# Patient Record
Sex: FEMALE | Race: WHITE
[De-identification: names, ages, dates, MRNs, and addresses within clinical notes are randomized per-mention and may not be internally consistent; named-entity substitution may affect disease eponyms.]

---

## 2019-06-03 ENCOUNTER — HOSPITAL ENCOUNTER (INPATIENT)
Dept: HOSPITAL 10 - E/R | Age: 54
LOS: 3 days | Discharge: HOME HEALTH | DRG: 103 | End: 2019-06-06
Attending: INTERNAL MEDICINE | Admitting: INTERNAL MEDICINE
Payer: COMMERCIAL

## 2019-06-03 ENCOUNTER — HOSPITAL ENCOUNTER (INPATIENT)
Dept: HOSPITAL 91 - E/R | Age: 54
LOS: 3 days | Discharge: HOME HEALTH | DRG: 103 | End: 2019-06-06
Payer: COMMERCIAL

## 2019-06-03 VITALS
WEIGHT: 181.88 LBS | HEIGHT: 63 IN | BODY MASS INDEX: 32.23 KG/M2 | WEIGHT: 181.88 LBS | HEIGHT: 63 IN | BODY MASS INDEX: 32.23 KG/M2

## 2019-06-03 VITALS — RESPIRATION RATE: 18 BRPM | SYSTOLIC BLOOD PRESSURE: 158 MMHG | DIASTOLIC BLOOD PRESSURE: 76 MMHG | HEART RATE: 56 BPM

## 2019-06-03 VITALS — HEART RATE: 64 BPM

## 2019-06-03 DIAGNOSIS — R20.0: ICD-10-CM

## 2019-06-03 DIAGNOSIS — R47.01: ICD-10-CM

## 2019-06-03 DIAGNOSIS — G81.94: ICD-10-CM

## 2019-06-03 DIAGNOSIS — G43.809: Primary | ICD-10-CM

## 2019-06-03 DIAGNOSIS — E11.9: ICD-10-CM

## 2019-06-03 DIAGNOSIS — Z90.49: ICD-10-CM

## 2019-06-03 DIAGNOSIS — I10: ICD-10-CM

## 2019-06-03 DIAGNOSIS — Z86.73: ICD-10-CM

## 2019-06-03 LAB
ADD MAN DIFF?: NO
ADD UMIC: YES
ANION GAP: 7 (ref 5–13)
BASOPHIL #: 0 10^3/UL (ref 0–0.1)
BASOPHILS %: 0.5 % (ref 0–2)
BLOOD UREA NITROGEN: 13 MG/DL (ref 7–20)
CALCIUM: 9.1 MG/DL (ref 8.4–10.2)
CARBON DIOXIDE: 27 MMOL/L (ref 21–31)
CHLORIDE: 108 MMOL/L (ref 97–110)
CHOL/HDL RATIO: 4.2 RATIO
CHOLESTEROL: 168 MG/DL (ref 100–200)
CK INDEX: 0.3
CK-MB: 0.24 NG/ML (ref 0–2.4)
CREATINE KINASE: 75 IU/L (ref 23–200)
CREATININE: 0.65 MG/DL (ref 0.44–1)
EOSINOPHILS #: 0.2 10^3/UL (ref 0–0.5)
EOSINOPHILS %: 2.3 % (ref 0–7)
ETHANOL: < 10 MG/DL (ref 0–0)
GLUCOSE: 92 MG/DL (ref 70–220)
HDL CHOLESTEROL: 40 MG/DL (ref 37–92)
HEMATOCRIT: 40.3 % (ref 37–47)
HEMOGLOBIN A1C: 5.4 % (ref 0–5.9)
HEMOGLOBIN: 14 G/DL (ref 12–16)
IMMATURE GRANS #M: 0.02 10^3/UL (ref 0–0.03)
IMMATURE GRANS % (M): 0.3 % (ref 0–0.43)
INR: 0.85
LDL CHOLESTEROL,CALCULATED: 88 MG/DL
LYMPHOCYTES #: 2.9 10^3/UL (ref 0.8–2.9)
LYMPHOCYTES %: 43.6 % (ref 15–51)
MEAN CORPUSCULAR HEMOGLOBIN: 28.9 PG (ref 29–33)
MEAN CORPUSCULAR HGB CONC: 34.7 G/DL (ref 32–37)
MEAN CORPUSCULAR VOLUME: 83.3 FL (ref 82–101)
MEAN PLATELET VOLUME: 10.7 FL (ref 7.4–10.4)
MONOCYTE #: 0.5 10^3/UL (ref 0.3–0.9)
MONOCYTES %: 7.9 % (ref 0–11)
NEUTROPHIL #: 3 10^3/UL (ref 1.6–7.5)
NEUTROPHILS %: 45.4 % (ref 39–77)
NUCLEATED RED BLOOD CELLS #: 0 10^3/UL (ref 0–0)
NUCLEATED RED BLOOD CELLS%: 0 /100WBC (ref 0–0)
PARTIAL THROMBOPLASTIN TIME: 29.7 SEC (ref 23–35)
PLATELET COUNT: 192 10^3/UL (ref 140–415)
POTASSIUM: 3.5 MMOL/L (ref 3.5–5.1)
PROTIME: 11.7 SEC (ref 11.9–14.9)
PT RATIO: 0.9
RED BLOOD COUNT: 4.84 10^6/UL (ref 4.2–5.4)
RED CELL DISTRIBUTION WIDTH: 12.1 % (ref 11.5–14.5)
SODIUM: 142 MMOL/L (ref 135–144)
TRIGLYCERIDES: 198 MG/DL (ref 0–149)
TROPONIN-I: < 0.012 NG/ML (ref 0–0.12)
UR ASCORBIC ACID: NEGATIVE MG/DL
UR BACTERIA: (no result) /HPF
UR BILIRUBIN (DIP): NEGATIVE MG/DL
UR BLOOD (DIP): (no result) MG/DL
UR CLARITY: (no result)
UR COLOR: YELLOW
UR GLUCOSE (DIP): NEGATIVE MG/DL
UR KETONES (DIP): NEGATIVE MG/DL
UR LEUKOCYTE ESTERASE (DIP): NEGATIVE LEU/UL
UR NITRITE (DIP): NEGATIVE MG/DL
UR PH (DIP): 7 (ref 5–9)
UR RBC: 1 /HPF (ref 0–5)
UR SPECIFIC GRAVITY (DIP): 1.01 (ref 1–1.03)
UR SQUAMOUS EPITHELIAL CELL: (no result) /HPF
UR TOTAL PROTEIN (DIP): NEGATIVE MG/DL
UR UROBILINOGEN (DIP): NEGATIVE MG/DL
UR WBC: 1 /HPF (ref 0–5)
WHITE BLOOD COUNT: 6.6 10^3/UL (ref 4.8–10.8)

## 2019-06-03 PROCEDURE — 93005 ELECTROCARDIOGRAM TRACING: CPT

## 2019-06-03 PROCEDURE — 85730 THROMBOPLASTIN TIME PARTIAL: CPT

## 2019-06-03 PROCEDURE — 70496 CT ANGIOGRAPHY HEAD: CPT

## 2019-06-03 PROCEDURE — 92526 ORAL FUNCTION THERAPY: CPT

## 2019-06-03 PROCEDURE — 82553 CREATINE MB FRACTION: CPT

## 2019-06-03 PROCEDURE — 80061 LIPID PANEL: CPT

## 2019-06-03 PROCEDURE — 71045 X-RAY EXAM CHEST 1 VIEW: CPT

## 2019-06-03 PROCEDURE — 70551 MRI BRAIN STEM W/O DYE: CPT

## 2019-06-03 PROCEDURE — 97530 THERAPEUTIC ACTIVITIES: CPT

## 2019-06-03 PROCEDURE — 96374 THER/PROPH/DIAG INJ IV PUSH: CPT

## 2019-06-03 PROCEDURE — 82962 GLUCOSE BLOOD TEST: CPT

## 2019-06-03 PROCEDURE — 36415 COLL VENOUS BLD VENIPUNCTURE: CPT

## 2019-06-03 PROCEDURE — 97116 GAIT TRAINING THERAPY: CPT

## 2019-06-03 PROCEDURE — 99285 EMERGENCY DEPT VISIT HI MDM: CPT

## 2019-06-03 PROCEDURE — 85025 COMPLETE CBC W/AUTO DIFF WBC: CPT

## 2019-06-03 PROCEDURE — 93306 TTE W/DOPPLER COMPLETE: CPT

## 2019-06-03 PROCEDURE — 70498 CT ANGIOGRAPHY NECK: CPT

## 2019-06-03 PROCEDURE — 85610 PROTHROMBIN TIME: CPT

## 2019-06-03 PROCEDURE — 84484 ASSAY OF TROPONIN QUANT: CPT

## 2019-06-03 PROCEDURE — 92610 EVALUATE SWALLOWING FUNCTION: CPT

## 2019-06-03 PROCEDURE — 80048 BASIC METABOLIC PNL TOTAL CA: CPT

## 2019-06-03 PROCEDURE — 82550 ASSAY OF CK (CPK): CPT

## 2019-06-03 PROCEDURE — 93880 EXTRACRANIAL BILAT STUDY: CPT

## 2019-06-03 PROCEDURE — 80307 DRUG TEST PRSMV CHEM ANLYZR: CPT

## 2019-06-03 PROCEDURE — 83036 HEMOGLOBIN GLYCOSYLATED A1C: CPT

## 2019-06-03 PROCEDURE — 70450 CT HEAD/BRAIN W/O DYE: CPT

## 2019-06-03 PROCEDURE — 97163 PT EVAL HIGH COMPLEX 45 MIN: CPT

## 2019-06-03 PROCEDURE — 81001 URINALYSIS AUTO W/SCOPE: CPT

## 2019-06-03 RX ADMIN — KETOROLAC TROMETHAMINE 1 MG: 30 INJECTION, SOLUTION INTRAMUSCULAR at 20:22

## 2019-06-03 RX ADMIN — VASOPRESSIN 1 ML/S: 20 INJECTION, SOLUTION INTRAMUSCULAR; SUBCUTANEOUS at 19:57

## 2019-06-03 RX ADMIN — IOHEXOL 1 ML/S: 350 INJECTION, SOLUTION INTRAVENOUS at 19:56

## 2019-06-03 RX ADMIN — SODIUM CHLORIDE 1 ML: 9 INJECTION, SOLUTION INTRAMUSCULAR; INTRAVENOUS; SUBCUTANEOUS at 19:57

## 2019-06-03 NOTE — ERD
ER Documentation


Chief Complaint


Chief Complaint





L SIDE FACIAL NUMBNESS AND L SIDE WEAKNESS, ONSET 0100. EXPRESSIVE APHASIA





HPI


53-year-old female with a history of stroke with no chronic deficits presenting 


with complaints of left facial numbness, left arm and leg weakness.  Her 


symptoms started around 1 AM with initially a right-sided throbbing headache.  


She is unsure exactly when her weakness started.  She denies any associated 


nausea, vomiting, dizziness.  She does complain of problems with her vision, 


mostly double vision.  No recent fevers or chills.  Her last stroke was in 2016 


and she had similar symptoms at that time.  No neck pain or stiffness.  No chest


pain or shortness of breath at this time.





ROS


All systems reviewed and are negative except as per history of present illness.





Medications


Home Meds


Discontinued Reported Medications


Calcium Carbonate (Tums EX) 1 Tab.chew Tab.chew


   8/16/10





Allergies


Allergies:  


Coded Allergies:  


     No Known Allergy (Verified  Allergy, Unknown, 8/16/10)


Uncoded Allergies:  


     BUSCAPINE (Allergy, Mild, 8/2/10)


     chocolates (Allergy, Mild, 8/18/10)


     eggs (Allergy, Mild, itchy, 8/18/10)


     peanuts (Allergy, Mild, 8/18/10)





PMhx/Soc


History of Surgery:  Yes (appendectomy, , cholesystecomy )


Anesthesia Reaction:  No


Hx Neurological Disorder:  Yes (CVA)


Hx Respiratory Disorders:  No


Hx Cardiac Disorders:  Yes (Hypertension)


Hx Psychiatric Problems:  Yes


Hx Miscellaneous Medical Probl:  Yes ( diabetes)


Hx Alcohol Use:  No


Hx Substance Use:  No


Hx Tobacco Use:  No


Smoking Status:  Never smoker





FmHx


Family History:  diabetes





Physical Exam


Vitals





Vital Signs


  Date      Temp  Pulse  Resp  B/P (MAP)   Pulse Ox  O2          O2 Flow    FiO2


Time                                                 Delivery    Rate


    6/3/19           62    16      122/66        99  Nasal             2.0


     21:39                           (84)            Cannula


    6/3/19           58    15      148/91        97  Room Air


     20:09                          (110)


    6/3/19           60    22      148/94        98  Room Air


     18:30                          (112)


    6/3/19  98.8     60    20      157/91        99  Room Air


     18:10                          (113)


    6/3/19                                           Nasal               2


     18:00                                           Cannula


    6/3/19  98.8     69    20      153/92        99  Room Air


     17:48                          (112)


    6/3/19  98.8     76    20      190/88        99


     17:27                          (122)





Physical Exam


Const:   No acute distress


Head:   Atraumatic 


Eyes:    Normal Conjunctiva, PERRLA, EOMI, no nystagmus.


ENT:    Normal External Ears, Nose and Mouth.


Neck:               Full range of motion. No meningismus.


Resp:   Clear to auscultation bilaterally


Cardio:   Regular rate and rhythm, no murmurs.  2+ distal pulses


Abd:    Soft, non tender, non distended. Normal bowel sounds


Skin:   No petechiae or rashes


Back:   No midline or flank tenderness


Ext:    No cyanosis, or edema


Neur:   Awake and alert, oriented x3, normal speech but slowed, no dysarthria.  


3 out of 5 strength in left upper and left lower extremity.  5 out of 5 strength


in right upper and lower extremities.  No facial asymmetry.  Cranial nerves 


intact.  Sensations diminished on the left side to painful stimuli.


Psych:    Normal Mood and Affect


Result Diagram:  


6/3/19 1750                                                                     


          6/3/19 175





Results 24 hrs





Laboratory Tests


Test
                                6/3/19
17:50  6/3/19
18:00     6/3/19
20:05


White Blood Count                    6.6 10^3/ul


Red Blood Count                     4.84 10^6/ul


Hemoglobin                             14.0 g/dl


Hematocrit                                40.3 %


Mean Corpuscular Volume                  83.3 fl


Mean Corpuscular Hemoglobin              28.9 pg


Mean Corpuscular                      34.7 g/dl 
  
             



Hemoglobin
Concent


Red Cell Distribution Width               12.1 %


Platelet Count                       192 10^3/UL


Mean Platelet Volume                     10.7 fl


Immature Granulocytes %                  0.300 %


Neutrophils %                             45.4 %


Lymphocytes %                             43.6 %


Monocytes %                                7.9 %


Eosinophils %                              2.3 %


Basophils %                                0.5 %


Nucleated Red Blood Cells %          0.0 /100WBC


Immature Granulocytes #            0.020 10^3/ul


Neutrophils #                        3.0 10^3/ul


Lymphocytes #                        2.9 10^3/ul


Monocytes #                          0.5 10^3/ul


Eosinophils #                        0.2 10^3/ul


Basophils #                          0.0 10^3/ul


Nucleated Red Blood Cells #          0.0 10^3/ul


Prothrombin Time                        11.7 Sec


Prothrombin Time Ratio                       0.9


INR International                          0.85 
  
             



Normalized
Ratio


Activated Partial
Thromboplast         29.7 Sec 
  
             



Time


Sodium Level                          142 mmol/L


Potassium Level                       3.5 mmol/L


Chloride Level                        108 mmol/L


Carbon Dioxide Level                   27 mmol/L


Anion Gap                                      7


Blood Urea Nitrogen                     13 mg/dl


Creatinine                            0.65 mg/dl


Est Glomerular Filtrat             > 60 mL/min 
   
             



Rate
mL/min


Glucose Level                           92 mg/dl


Hemoglobin A1c                             5.4 %


Calcium Level                          9.1 mg/dl


Creatine Kinase                          75 IU/L


Creatine Kinase Index                        0.3


Creatinine Kinase MB (Mass)           0.24 ng/ml


Troponin I                         < 0.012 ng/ml


Triglycerides Level                    198 mg/dl


Cholesterol Level                      168 mg/dl


LDL Cholesterol, Calculated             88 mg/dl


HDL Cholesterol                         40 mg/dl


Cholesterol/HDL Ratio                  4.2 RATIO


Ethyl Alcohol Level                < 10.0 mg/dl


Bedside Glucose                                      106 mg/dL


Urine Color                                                      YELLOW


Urine Clarity
                     
               
             SLIGHTLY
CLOUDY


Urine pH                                                                    7.0


Urine Specific Gravity                                                    1.009


Urine Ketones                                                    NEGATIVE mg/dL


Urine Nitrite                                                    NEGATIVE mg/dL


Urine Bilirubin                                                  NEGATIVE mg/dL


Urine Urobilinogen                                               NEGATIVE mg/dL


Urine Leukocyte Esterase
          
               
             NEGATIVE
Enrique/ul


Urine Microscopic RBC                                                    1 /HPF


Urine Microscopic WBC                                                    1 /HPF


Urine Squamous Epithelial
Cells    
               
             MODERATE /HPF 



Urine Bacteria                                                   FEW /HPF


Urine Hemoglobin                                                       1+ mg/dL


Urine Glucose                                                    NEGATIVE mg/dL


Urine Total Protein                                              NEGATIVE mg/dl


Urine Opiates Screen                                             Negative


Urine Barbiturates                                               Negative


Urine Amphetamines Screen                                        Negative


Urine Benzodiazepines Screen                                     Negative


Urine Cocaine Screen                                             Negative


Urine Cannabinoids                                               Negative





Current Medications


 Medications
   Dose
          Sig/[place]
       Start Time
   Status  Last


 (Trade)       Ordered        Route
 PRN     Stop Time              Admin
Dose


                              Reason                                Admin


 IV Flush
      10 ml          STK-MED        6/3/19        DC            6/3/19


(NS 10 ml)                    ONCE
 .ROUTE
  19:24
 6/3/19                19:57



                                             19:25


 Sodium         100 ml @ ud    STK-MED        6/3/19        DC            6/3/19


Chloride                      ONCE
 .ROUTE
  19:24
 6/3/19                19:57



                                             19:25


 Iohexol        100 ml @ ud    STK-MED        6/3/19        DC            6/3/19


                              ONCE
 .ROUTE
  19:24
 6/3/19                19:56



                                             19:25


 Ketorolac
     30 mg          ONCE  STAT
    6/3/19        DC            6/3/19


Tromethamine
                 IV
            20:12
 6/3/19                20:22



 (Toradol)                                   20:13


 Ondansetron    4 mg           [facility] BRIDGE      6/3/19                 



HCl
  (Zofran                 PRN
 IV
       21:30
 19


Inj)                          NAUSEA/VOMITI  21:29


                              NG


                650 mg         ER BRIDGE      6/3/19                 



Acetaminophen                 PRN
 PO
       21:30
 19



  (Tylenol                   .MILD PAIN     21:29


Tab)                          1-3 OR TEMP


 Aspirin
       81 mg          DAILY
 PO
     19        UNV      



(Halfprin)                                   09:00



 Zolpidem       5 mg           HS  PRN
 PO
   6/3/19        UNV      



Tartrate
                     INSOMNIA       22:00



(Ambien)








Procedures/MDM


EMERGENT LABS AND DIAGNOSTIC STUDIES: 


Lab Results above were reviewed and interpreted by me. 





CBC: no anemia or evidence of infection


CMP: No evidence of clinically significant electrolyte abnormality, acidosis, 


renal failure, hypoglycemia, liver disease, or biliary obstruction


Troponin within normal limits, not indicative of cardiac ischemia


UDS negative


Alcohol negative


UA: no evidence of infection





12-lead EKG was interpreted by CAMDEN Davidson MD: 


Normal Sinus Rhythm with ventricular rate of 62 beats per minute 


Normal axis 


Normal intervals 


No acute ST or T wave changes suggestive of acute ischemia or STEMI. 


___________________________________________________________ 


Radiology Results as interpreted by Radiology below were reviewed by SLito Davidson MD: 





CTA brain and neck show no large vessel occlusions








CT head shows no acute abnormalities


___________________________________________________________ 


Initial Nursing notes reviewed. 


Previous Medical Records requested via the Electronic Health Record. 





EMERGENCY DEPARTMENT COURSE / MEDICAL DECISION MAKING: 





Patient is presenting with left-sided weakness that started greater than 4 hours


ago.  The time of onset is very unclear.  Patient's vitals were notable for 


hypertension without any other significant abnormalities.  Code stroke was 


activated upon patient arrival.  However she is not a TPA candidate as time of 


onset is unclear but was definitely more than 4.5 hours ago.  I spoke with the 


tele-neurologist, who also evaluated the patient, and agrees the patient does 


not meet TPA criteria.  He did recommend admission for MRI and further stroke 


work-up, especially given the patient's history of stroke.





Neuro Critical Care:


   Time:            45 minutes


   Treatments/Evaluations:   Continuous neurologic and cardiovascular monitoring


for deterioration of neurologic function and complications, while obtaining 


immediate neurologic imaging.  Considerations made for TPA and invasive therapy 


with discussions with family.





TPA Criteria Assessment:


Patient is not a TPA candidate because: 


[Last known normal > 3 hours


Time Onset Unkown


Minimal or rapidly improving symptoms


Bleeding Diathesis


Ct shows > 1/3 MCA territory involvement


CT shows ICH


BP >185/110 even with treatment


Aggressive BP therapy required i.e. Nicardipine gtt


Hyper or Hypoglycemia < 50 or > 400 mg/dl


Seizure at onset


Stroke or Head injury < 3 months


LP < 24 hours


Major Surgery < 14 days


Brain Tumor, AVM, Aneurysm


GI/ bleed < 21 days


Arterial Puncture < 7 days


Caution NIHSS > 22





 For Symptoms 3-4.5 hrs


Age > 80


NIHSS > 25


Any Anticoagulant Use


History of CVA and DM]

















Accepting Care Team:


Current data and ongoing care discussed.


Time:                      Time of admission


Primary Provider:      [XOXOXO]


Consulting:                  [XOXOXO]


Outstanding Data:       none











Accepting Care Team:


Current data and ongoing care discussed.


Time:                      Time of admission


Primary Provider:      Dr. Devlin


Consulting:                  Dr. Burnett with tele-neurology


Outstanding Data:       none





Departure


Diagnosis:  


   Primary Impression:  


   Left-sided weakness


Condition:  KATTY Sagastume MD          Brendon 3, 2019 21:21

## 2019-06-03 NOTE — STROKE
Date/Time of Note


Date/Time of Note


DATE: 6/3/19 


TIME: 18:23





Patient Information


General


Patient location:  emergency


Arrival Date





Age


53


Gender


female


Weight


83.9 kg





POC Glucose


Glucose Result





Bedside Glucose - 72 Hours


                      Test
                   6/3/19
18:00


                      Bedside Glucose
  106 mg/dL
()








Vital Signs


Vital Signs





Vital Signs


  Date      Temp  Pulse  Resp  B/P (MAP)   Pulse Ox  O2          O2 Flow    FiO2


Time                                                 Delivery    Rate


    6/3/19  98.8     60    20      157/91        99  Room Air


     18:10                          (113)


    6/3/19                                                               2


     18:00








Patient History


Current Medications


Allergies:  


Coded Allergies:  


     No Known Allergy (Verified  Allergy, Unknown, 8/16/10)


Uncoded Allergies:  


     BUSCAPINE (Allergy, Mild, 8/2/10)


     chocolates (Allergy, Mild, 8/18/10)


     eggs (Allergy, Mild, itchy, 8/18/10)


     peanuts (Allergy, Mild, 8/18/10)





Labs


Coagulation Labs:





Coagulation


                              Test
  6/3/19
17:50








History & Physical


History of Present Illness


53 F PMH stroke, HTN, DM with stroke in 2016 LKW when went to bed on 6/2/2019 


with a complaint of right-sided headache that she awoke with at 0100 PST. Also 


reporting left-sided weakness, double vision, and speech abnormality.





NIH Stroke Scale


NIH Stroke Scale


                     [id number]
Total Score:  [id number]
Date/Time Recorded


DATE: 6/3/19 


TIME: 18:23


Submitted By


Robin Villalobos





t-PA


Imaging Review


Date/Time Imaging Reviewed


DATE: 6/3/19 


TIME: 18:23





t-PA Administration


Weight


83.9 kg


Recommedation submitted by


Robin Villalobos





Recommendations


Recommendation


53 F with headache and left hemiparesis. There are findings on exam concerning 


for functional deficits, but stroke needs to be ruled-out. Not in tPA or 


mechanical thrombectomy eval time windows.





- MRI Brain to further rule-out stroke. Frequent neuro checks per protocol with 


telemetry while MRI Brain pending


- If acute stroke on MRI Brain then needs full stroke work-up ( MRA Head/Neck, 


TTE with bubble, blood cultures, telemetry, EKG, LDL, A1c, SP, PT, OT) and 


antiplatelet loading as guided by local Neurologist











ROBIN VILLALOBOS MD                  Brendon 3, 2019 18:24

## 2019-06-04 VITALS — DIASTOLIC BLOOD PRESSURE: 60 MMHG | RESPIRATION RATE: 18 BRPM | SYSTOLIC BLOOD PRESSURE: 113 MMHG | HEART RATE: 80 BPM

## 2019-06-04 VITALS — DIASTOLIC BLOOD PRESSURE: 58 MMHG | HEART RATE: 73 BPM | RESPIRATION RATE: 19 BRPM | SYSTOLIC BLOOD PRESSURE: 107 MMHG

## 2019-06-04 VITALS — RESPIRATION RATE: 16 BRPM | DIASTOLIC BLOOD PRESSURE: 70 MMHG | HEART RATE: 76 BPM | SYSTOLIC BLOOD PRESSURE: 163 MMHG

## 2019-06-04 VITALS — RESPIRATION RATE: 20 BRPM | DIASTOLIC BLOOD PRESSURE: 69 MMHG | HEART RATE: 67 BPM | SYSTOLIC BLOOD PRESSURE: 113 MMHG

## 2019-06-04 VITALS — HEART RATE: 68 BPM

## 2019-06-04 VITALS — RESPIRATION RATE: 20 BRPM | DIASTOLIC BLOOD PRESSURE: 74 MMHG | SYSTOLIC BLOOD PRESSURE: 137 MMHG | HEART RATE: 66 BPM

## 2019-06-04 VITALS — SYSTOLIC BLOOD PRESSURE: 135 MMHG | DIASTOLIC BLOOD PRESSURE: 71 MMHG | HEART RATE: 58 BPM | RESPIRATION RATE: 19 BRPM

## 2019-06-04 VITALS — HEART RATE: 64 BPM

## 2019-06-04 RX ADMIN — ATORVASTATIN CALCIUM 1 MG: 40 TABLET, FILM COATED ORAL at 21:00

## 2019-06-04 RX ADMIN — ATORVASTATIN CALCIUM 1 MG: 40 TABLET, FILM COATED ORAL at 21:18

## 2019-06-04 RX ADMIN — ATORVASTATIN CALCIUM SCH MG: 40 TABLET, FILM COATED ORAL at 21:18

## 2019-06-04 RX ADMIN — ATORVASTATIN CALCIUM SCH MG: 40 TABLET, FILM COATED ORAL at 21:00

## 2019-06-04 RX ADMIN — CLOPIDOGREL 1 MG: 75 TABLET, FILM COATED ORAL at 09:30

## 2019-06-04 RX ADMIN — ACETAMINOPHEN 1 MG: 325 TABLET, FILM COATED ORAL at 06:54

## 2019-06-04 RX ADMIN — FOLIC ACID SCH MLS/HR: 5 INJECTION, SOLUTION INTRAMUSCULAR; INTRAVENOUS; SUBCUTANEOUS at 15:37

## 2019-06-04 RX ADMIN — ASPIRIN 1 MG: 81 TABLET, COATED ORAL at 08:51

## 2019-06-04 RX ADMIN — ASCORBIC ACID, VITAMIN A PALMITATE, CHOLECALCIFEROL, THIAMINE HYDROCHLORIDE, RIBOFLAVIN-5 PHOSPHATE SODIUM, PYRIDOXINE HYDROCHLORIDE, NIACINAMIDE, DEXPANTHENOL, ALPHA-TOCOPHEROL ACETATE, VITAMIN K1, FOLIC ACID, BIOTIN, CYANOCOBALAMIN 1 MLS/HR: 200; 3300; 200; 6; 3.6; 6; 40; 15; 10; 150; 600; 60; 5 INJECTION, SOLUTION INTRAVENOUS at 15:37

## 2019-06-04 RX ADMIN — ACETAMINOPHEN 1 MG: 650 SUPPOSITORY RECTAL at 15:37

## 2019-06-04 NOTE — CONSI
Assessment/Plan


Assessment/Plan


Assessment/Plan (Recall)


52 yo F with multiple cerebrovascular risk factors who presents for evaluation 


of headache, dysphagia, and L hemiparesis... for which neurology is consulted. 





The clinical picture is most ominously concerning for acute ischemia. 





Complex migraines are a diagnosis of exclusion. 





CTH is unrevealing. 


CTA H/N is unrevealing. 


Echo is unrevealing. 


CUS is unrevealing.








P;


Await MRI brain for further characterization


Agree w/ single antiplatelet therapy for secondary stroke prevention for now. 


Add Lipitor for the same.


BP and other medical management per primary


PT/OT/ST as necessary


Will follow clinically, to recommend neurologic studies, as necessary





Consultation Date/Type/Reason


Admit Date/Time


Brendon 3, 2019 at 21:12


Type of Consult


Neurology


Reason for Consultation


L sided weakness, headache


Requesting Provider:  AKHIL FORBES MD


Date/Time of Note


DATE: 6/4/19 


TIME: 16:20





Hx of Present Illness


52 yo F with hx of CVA, HTN, DM and other comorbidities who presented to the ED 


with c/o R sided headache with L sided weakness and speech changes.





History was obtained from pt and chart review. 





She continues to endorse headache, and weakness on the L. Denies other focal sx 


currently. Per the RN, the pt currently has difficulty swallowing.





It is elsewhere noted:


CHIEF COMPLAINT:  Left-sided weakness and difficulty speaking.


 


HISTORY OF PRESENT ILLNESS:  A 53-year-old female with a previous history of CVA


and resolved deficits, presented to emergency room with complaint of significant


left-sided weakness and associated expressive aphasia.  The symptoms started 


about 1:00 in the morning on the day of admission.  The patient also reported 


right-sided headaches.  There was no visual deficit.


 


Initial evaluation including CAT scan of the brain and CT angiogram of the head 


were unremarkable.  The patient was previously taking baby aspirin on a daily 


basis.  She was evaluated by telestroke neurologist.  There was no indication 


for thrombolysis since patient was outside the window.


negative unless noted otherwise in HPI





Objective


Exam


Vitals





Vital Signs


  Date      Temp  Pulse  Resp  B/P (MAP)   Pulse Ox  O2          O2 Flow    FiO2


Time                                                 Delivery    Rate


    6/4/19  97.4     76    16      154/70        96


     15:06                           (98)


    6/4/19                                           Room Air


     11:03


    6/3/19                                                             2.0


     21:39








Intake and Output





6/3/19


6/3/19


6/4/19





1515:00


23:00


07:00





IntakeIntake Total


0 ml





BalanceBalance


0 ml











Exam


PE:


Gen Appearance: No Apparent Distress


HEENT: Normocephalic


Cardiovascular: Regular rate


Lungs: Clear bilaterally


Abdomen: Soft


Extremities: Dry





NE:


The patient was alert and oriented. Language was normal. Fund of knowledge was 


normal.





Pupils were equal and reactive to light. There was no afferent pupillary defect.


Visual fields were normal. Funduscopic examination was limited. Extra-ocular 


movements were full. Ptosis was absent. There was no nystagmus. Facial sensation


was normal. Face was symmetric with normal strength. Hearing was intact. Palate 


movements were normal. Neck strength was normal. There was normal tongue bulk 


and speed of movement.





Tone was normal. Muscle bulk was normal. I did not see fasciculations. Arms and 


legs were weak on the L, L arm less than gravity and L leg greater than gravity.





Vibration sensation was normal. Temperature and pinprick sensation was normal.





Rapid alternating movements were normal. There was no dysmetria. There was no 


intention tremor. Gait was deferred due to bedrest.





Arm and leg reflexes were 2+ and symmetric. Cat's sign was absent. Plantar 


responses were flexor.





Results


Result Diagram:  


6/3/19 1750                                                                     


          6/3/19 1750





Results 24hrs





Laboratory Tests


Test
               6/3/19
17:50  6/3/19
18:00        6/3/19
20:05  6/4/19
06:27


White Blood Count          6.6


Red Blood Count           4.84


Hemoglobin                14.0


Hematocrit                40.3


Mean Corpuscular          83.3


Volume


Mean Corpuscular         28.9  L


Hemoglobin


Mean Corpuscular         34.7  
  
             
                   



Hemoglobin
Concent


Red Cell                  12.1


Distribution Width


Platelet Count             192


Mean Platelet            10.7  H


Volume


Immature                 0.300


Granulocytes %


Neutrophils %             45.4


Lymphocytes %             43.6


Monocytes %                7.9


Eosinophils %              2.3


Basophils %                0.5


Nucleated Red              0.0


Blood Cells %


Immature                 0.020


Granulocytes #


Neutrophils #              3.0


Lymphocytes #              2.9


Monocytes #                0.5


Eosinophils #              0.2


Basophils #                0.0


Nucleated Red              0.0


Blood Cells #


Prothrombin Time         11.7  L


Prothrombin Time           0.9


Ratio


INR International        0.85  
  
             
                   



Normalized
Ratio


Activated                29.7  
  
             
                   



Partial
Thrombopla


st Time


Sodium Level               142


Potassium Level            3.5


Chloride Level             108


Carbon Dioxide              27


Level


Anion Gap                    7


Blood Urea                  13


Nitrogen


Creatinine                0.65


Est Glomerular      > 60  
       
             
                   



Filtrat


Rate
mL/min


Glucose Level               92


Hemoglobin A1c             5.4


Calcium Level              9.1


Creatine Kinase             75


Creatine Kinase            0.3


Index


Creatinine Kinase         0.24


MB (Mass)


Troponin I          < 0.012


Triglycerides             198  H


Level


Cholesterol Level          168


LDL Cholesterol,            88


Calculated


HDL Cholesterol             40


Cholesterol/HDL            4.2


Ratio


Ethyl Alcohol       < 10.0  H


Level


Bedside Glucose                          106                               112


Urine Color                                     YELLOW


Urine Clarity
      
             
             SLIGHTLY
CLOUDY  A  



Urine pH                                                     7.0


Urine Specific                                             1.009


Gravity


Urine Ketones                                   NEGATIVE


Urine Nitrite                                   NEGATIVE


Urine Bilirubin                                 NEGATIVE


Urine Urobilinogen                              NEGATIVE


Urine Leukocyte                                 NEGATIVE


Esterase


Urine Microscopic                                              1


RBC


Urine Microscopic                                              1


WBC


Urine Squamous      
             
             MODERATE  
         



Epithelial
Cells


Urine Bacteria                                  FEW  A


Urine Hemoglobin                                             1+  H


Urine Glucose                                   NEGATIVE


Urine Total                                     NEGATIVE


Protein


Urine Opiates                                   Negative


Screen


Urine Barbiturates                              Negative


Urine Amphetamines                              Negative


Screen


Urine                                           Negative


Benzodiazepines


Screen


Urine Cocaine                                   Negative


Screen


Urine Cannabinoids                              Negative


Test
               6/4/19
15:11  
             
                   



Bedside Glucose            102








Past Medical History


reviewed


Home Meds


Discontinued Reported Medications


Calcium Carbonate (Tums EX) 1 Tab.chew Tab.chew


   8/16/10


Medications





Current Medications


Ondansetron HCl (Zofran Inj) 4 mg ER BRIDGE PRN IV NAUSEA/VOMITING;  Start 


6/3/19 at 21:30;  Stop 6/4/19 at 21:29


Acetaminophen (Tylenol Tab) 650 mg ER BRIDGE PRN PO .MILD PAIN 1-3 OR TEMP Last 


administered on 6/4/19at 06:54; Admin Dose 650 MG;  Start 6/3/19 at 21:30;  Stop


6/4/19 at 21:29


Zolpidem Tartrate (Ambien) 5 mg HS  PRN PO INSOMNIA;  Start 6/3/19 at 22:00


Acetaminophen (Tylenol Supp) 650 mg Q6H  PRN [place] PAIN Last administered on 


6/4/19at 15:37; Admin Dose 650 MG;  Start 6/4/19 at 15:30


Dextrose/Sodium Chloride 1,000 ml @  100 mls/hr Q10H IV  Last administered on 


6/4/19at 15:37; Admin Dose 100 MLS/HR;  Start 6/4/19 at 15:30


Aspirin (Aspirin) 81 mg DAILY PO ;  Start 6/5/19 at 09:00;  Status UNV


Atorvastatin Calcium (Lipitor) 40 mg HS PO ;  Start 6/4/19 at 21:00


Allergies:  


Coded Allergies:  


     No Known Allergy (Verified  Allergy, Unknown, 8/16/10)


Uncoded Allergies:  


     BUSCAPINE (Allergy, Mild, 8/2/10)


     chocolates (Allergy, Mild, 8/18/10)


     eggs (Allergy, Mild, itchy, 8/18/10)


     peanuts (Allergy, Mild, 8/18/10)





Past Surgical History


reviewed





Social History


reviewed


Smoking Status:  Never smoker











ONUR MERRITT NP           Jun 4, 2019 16:20


EMILEE SKINNER                  Jun 4, 2019 19:52

## 2019-06-04 NOTE — HP
DATE OF ADMISSION: 06/03/2019

 

CHIEF COMPLAINT:  Left-sided weakness and difficulty speaking.

 

HISTORY OF PRESENT ILLNESS:  A 53-year-old female with a previous history of CVA and resolved deficit
s, presented to emergency room with complaint of significant left-sided weakness and associated expre
ssive aphasia.  The symptoms started about 1:00 in the morning on the day of admission.  The patient 
also reported right-sided headaches.  There was no visual deficit.

 

Initial evaluation including CAT scan of the brain and CT angiogram of the head were unremarkable.  T
he patient was previously taking baby aspirin on a daily basis.  She was evaluated by telestroke neur
ologist.  There was no indication for thrombolysis since patient was outside the window.

 

PAST MEDICAL HISTORY:  History of old CVA.

 

SOCIAL HISTORY:  The patient lives at home.  She denies tobacco or alcohol use.

 

PHYSICAL EXAMINATION:

GENERAL:  Well-developed, well-nourished female who is in no apparent distress.

VITAL SIGNS:  Stable.  She is afebrile.

HEENT:  Extraocular muscles are intact.  Pupils are equal and reactive to light bilaterally.  Sclerae
 are anicteric.  Oropharynx is clear and moist.

NECK:  Supple. No JVD, no carotid bruits.

LUNGS:  Clear to auscultation bilaterally.

CARDIAC:  Regular rate and rhythm.  No murmurs, rubs or gallops.

ABDOMEN:  Soft, nontender, nondistended, normoactive bowel sounds.

EXTREMITIES:  No clubbing, cyanosis or edema.

NEUROLOGICAL:  The patient had 2/5 strength in the left upper extremity, 3/5 strength in the left low
er extremity.  Right side is normal strength.  Speech is normal.  Coordination is intact.

 

DIAGNOSTIC DATA:  Carotid Doppler did not show any evidence of hemodynamically significant stenosis i
n the bilateral internal carotid arteries.

 

ASSESSMENT:

1.  A 53-year-old female with acute left-sided hemiparesis.  Rule out acute cerebrovascular accident.


2.  Expressive aphasia, resolved.

3.  Right-sided headaches.

 

PLAN:

1.  Place in tele observation. Proceed with MRI of brain.

2.  Discontinue aspirin and start Plavix 75 mg p.o. daily.

3.  Physical therapy and evaluation.

4.  A 2D echo.

5.  Neurology consultation will be requested.

 

 

Dictated By: AKHIL DA SILVA/PRAVEEN

DD:    06/04/2019 10:00:28

DT:    06/04/2019 14:23:18

Conf#: [id number]

DID#:  [id number]

CC: RUPA MORRIS MD;*End*

## 2019-06-04 NOTE — RADRPT
Echocardiogram Report

 

Patient Name: ASHISH LANDERSPatient ID: [id number]

: 1965 (53y 10m)Study Date: 2019 10:02:43 AM

Gender: FAccession #: [id number]

Tech: Robin Oh [facility]            Location: 516-A        

Ref.Physician: AKHIL FORBES            Height(Cm):            

 

BSA: Weight(Kg):

Quality: AdequateOrder Physician: AKHIL FORBES

Account #:

 

 

Procedures:

 

Echocardiographic Report:

Transthoracic echocardiogram with complete 2D, M-Mode, and doppler 

examination.

 

Indications:

 

Stroke work up.

 

 

Measurements:

2D/M Mode                                          Doppler                                           
    

Measurement    Value    Normal Range               Measurement      Value    Normal Range            
    

LVIDd 2D       4.3      [ 3.8 - 5.2 ] cm           AV Peak Declan      1.5      [ 100.0 - 170.0 ] cm/sec
    

LVIDs 2D       2.8      [ 2.2 - 3.5 ] cm           AV Peak PG       9.0      [ 2.0 - 9.0 ] mmHg      
    

LVPWd 2D       0.9      [ 0.6 - 0.9 ] cm           LVOT Peak Declan    1.1      [ 70.0 - 110.0 ] cm/sec 
    

IVSd 2D        1.0      [ 0.6 - 0.9 ] cm           LVOT Peak PG     5.0      [ 2.0 - 6.0 ] mmHg      
    

AoR Diam 2D    2.5      [ 2.3 - 3.1 ] cm           MV E Peak Declan    0.8      [ 60.0 - 130.0 ] cm/sec 
    

EDV 2D         85.4     [ 46.0 - 106.0 ] ml        MV A Peak Declan    0.7      [ 100.0 - 120.0 ] cm/sec
    

ESV 2D         30.3     [ 14.0 - 42.0 ] ml         MV E/A           1.1      [ 0.8 - 1.5 ] ratio     
    

EF 2D          64.5     [ 54.0 - 74.0 ] percent    MV Decel Time    194      [ 104 - 258 ] msec      
    

LA Dimen 2D    2.9      [ 2.7 - 3.8 ] cm           Lat E` Declan       0.1      [ 10.0 - 15.0 ] cm/sec  
    

                                                   Lateral E/E`     6.4      [ 1.0 - 2.0 ] ratio     
    

                                                   MV E/A           1.1      [ 0.8 - 1.5 ] ratio     
    

                                                   TR Peak Declan      2.7      [ 100.0 - 280.0 ] cm/sec
    

                                                   TR Peak PG       29.0     mmHg                    
    

                                                   RVSP             32.0     [ 10.0 - 36.0 ] mmHg    
    

 

Findings:

 

Left Ventricle:

Normal left ventricular systolic function. Normal left ventricular 

cavity size. Normal left ventricular wall thickness. Ejection fraction 

is visually estimated at 65 %. Abnormal Diastolic Function.

 

Right Ventricle:

Normal right ventricular size. Normal right ventricular systolic 

function.

 

Left Atrium:

The left atrium is normal in size.

 

Right Atrium:

The right atrium is normal in size.

 

Mitral Valve:

Mild mitral leaflet calcification. Mild mitral annular calcification. 

Trace mitral regurgitation.

 

Aortic Valve:

No hemodynamically significant aortic stenosis by doppler. Aortic cusps 

appear mildly calcified.

 

Tricuspid Valve:

Normal appearance and function of the tricuspid valve with trace 

physiologic regurgitation. The estimated Peak RVSP is 32 mmHg.

 

Pericardium:

Normal pericardium with no significant pericardial effusion.

 

Aorta:

Normal aortic root.

 

IVC:

Normal size and normal respiratory collapse consistent with normal right 

atrial pressure.

 

 

Conclusions:

 

Normal left ventricular systolic function. Normal left ventricular 

cavity size. Normal left ventricular wall thickness. Ejection fraction 

is visually estimated at 65 %. Abnormal Diastolic Function.

 

Normal right ventricular size. Normal right ventricular systolic 

function.

 

The left atrium is normal in size.

 

The right atrium is normal in size.

 

No significant valvular stenosis or regurgitation seen.

 

Normal pericardium with no significant pericardial effusion.

 

Electronically Signed By:

 

Rafael Godwin

2019 11:55:50 PDT

## 2019-06-05 VITALS — SYSTOLIC BLOOD PRESSURE: 133 MMHG | HEART RATE: 63 BPM | DIASTOLIC BLOOD PRESSURE: 77 MMHG | RESPIRATION RATE: 17 BRPM

## 2019-06-05 VITALS — SYSTOLIC BLOOD PRESSURE: 106 MMHG | HEART RATE: 54 BPM | RESPIRATION RATE: 20 BRPM | DIASTOLIC BLOOD PRESSURE: 62 MMHG

## 2019-06-05 VITALS — HEART RATE: 64 BPM

## 2019-06-05 VITALS — RESPIRATION RATE: 16 BRPM | SYSTOLIC BLOOD PRESSURE: 139 MMHG | DIASTOLIC BLOOD PRESSURE: 74 MMHG | HEART RATE: 60 BPM

## 2019-06-05 VITALS — DIASTOLIC BLOOD PRESSURE: 64 MMHG | RESPIRATION RATE: 16 BRPM | HEART RATE: 64 BPM | SYSTOLIC BLOOD PRESSURE: 126 MMHG

## 2019-06-05 VITALS — HEART RATE: 75 BPM

## 2019-06-05 VITALS — HEART RATE: 56 BPM

## 2019-06-05 VITALS — HEART RATE: 59 BPM

## 2019-06-05 VITALS — SYSTOLIC BLOOD PRESSURE: 114 MMHG | DIASTOLIC BLOOD PRESSURE: 68 MMHG | HEART RATE: 56 BPM | RESPIRATION RATE: 20 BRPM

## 2019-06-05 RX ADMIN — ATORVASTATIN CALCIUM 1 MG: 40 TABLET, FILM COATED ORAL at 20:29

## 2019-06-05 RX ADMIN — KETOROLAC TROMETHAMINE 1 MG: 30 INJECTION, SOLUTION INTRAMUSCULAR at 15:47

## 2019-06-05 RX ADMIN — ASCORBIC ACID, VITAMIN A PALMITATE, CHOLECALCIFEROL, THIAMINE HYDROCHLORIDE, RIBOFLAVIN-5 PHOSPHATE SODIUM, PYRIDOXINE HYDROCHLORIDE, NIACINAMIDE, DEXPANTHENOL, ALPHA-TOCOPHEROL ACETATE, VITAMIN K1, FOLIC ACID, BIOTIN, CYANOCOBALAMIN 1 MLS/HR: 200; 3300; 200; 6; 3.6; 6; 40; 15; 10; 150; 600; 60; 5 INJECTION, SOLUTION INTRAVENOUS at 01:30

## 2019-06-05 RX ADMIN — ASCORBIC ACID, VITAMIN A PALMITATE, CHOLECALCIFEROL, THIAMINE HYDROCHLORIDE, RIBOFLAVIN-5 PHOSPHATE SODIUM, PYRIDOXINE HYDROCHLORIDE, NIACINAMIDE, DEXPANTHENOL, ALPHA-TOCOPHEROL ACETATE, VITAMIN K1, FOLIC ACID, BIOTIN, CYANOCOBALAMIN 1 MLS/HR: 200; 3300; 200; 6; 3.6; 6; 40; 15; 10; 150; 600; 60; 5 INJECTION, SOLUTION INTRAVENOUS at 05:30

## 2019-06-05 RX ADMIN — FOLIC ACID SCH MLS/HR: 5 INJECTION, SOLUTION INTRAMUSCULAR; INTRAVENOUS; SUBCUTANEOUS at 05:30

## 2019-06-05 RX ADMIN — ASPIRIN 81 MG SCH MG: 81 TABLET ORAL at 11:01

## 2019-06-05 RX ADMIN — KETOROLAC TROMETHAMINE 1 MG: 30 INJECTION, SOLUTION INTRAMUSCULAR at 23:53

## 2019-06-05 RX ADMIN — NAPROXEN 1 MG: 250 TABLET ORAL at 20:29

## 2019-06-05 RX ADMIN — ASCORBIC ACID, VITAMIN A PALMITATE, CHOLECALCIFEROL, THIAMINE HYDROCHLORIDE, RIBOFLAVIN-5 PHOSPHATE SODIUM, PYRIDOXINE HYDROCHLORIDE, NIACINAMIDE, DEXPANTHENOL, ALPHA-TOCOPHEROL ACETATE, VITAMIN K1, FOLIC ACID, BIOTIN, CYANOCOBALAMIN 1 MLS/HR: 200; 3300; 200; 6; 3.6; 6; 40; 15; 10; 150; 600; 60; 5 INJECTION, SOLUTION INTRAVENOUS at 18:16

## 2019-06-05 RX ADMIN — ASPIRIN 81 MG CHEWABLE TABLET 1 MG: 81 TABLET CHEWABLE at 08:00

## 2019-06-05 RX ADMIN — ATORVASTATIN CALCIUM SCH MG: 40 TABLET, FILM COATED ORAL at 20:29

## 2019-06-05 RX ADMIN — NAPROXEN SCH MG: 250 TABLET ORAL at 20:29

## 2019-06-05 RX ADMIN — ASPIRIN 81 MG SCH MG: 81 TABLET ORAL at 08:00

## 2019-06-05 RX ADMIN — ASPIRIN 81 MG CHEWABLE TABLET 1 MG: 81 TABLET CHEWABLE at 11:01

## 2019-06-05 RX ADMIN — FOLIC ACID SCH MLS/HR: 5 INJECTION, SOLUTION INTRAMUSCULAR; INTRAVENOUS; SUBCUTANEOUS at 18:16

## 2019-06-05 RX ADMIN — FOLIC ACID SCH MLS/HR: 5 INJECTION, SOLUTION INTRAMUSCULAR; INTRAVENOUS; SUBCUTANEOUS at 01:30

## 2019-06-05 NOTE — DS
DATE OF ADMISSION: 06/03/2019

DATE OF DISCHARGE: 06/05/2019

 

DISCHARGE DIAGNOSIS:  A 53-year-old female with a right-sided headache with associated left-sided wea
kness, resolved.

 

PROCEDURES DURING HOSPITALIZATION:  A CAT scan of brain, CT angiogram of the brain.  MRI of the brain
, carotid Doppler.

 

HOSPITAL COURSE:  A 53-year-old female with a presumed history of CVA in the past with no residual de
ficits, presented to emergency room with complaint of significant left-sided weakness associated with
 expressive aphasia.  Symptoms started after she developed severe right-sided headaches.  Initially, 
she was evaluated by telestroke neurologist.  She was not a candidate for thrombolysis.  Her expressi
ve aphasia resolved and her weakness improved during the hospitalization.  Neurology consultation was
 requested.  MRI of the brain; however, did not show any evidence of acute or old infarct.  There was
 minimal cerebral volume loss.  The patient is in stable condition for discharge.  Her left-sided wea
kness could be psychogenic in nature.  I discontinued aspirin and statin.  I asked her to follow up w
ith her primary care provider in 1 week.

 

 

Dictated By: AKHIL DA SILVA/PRAVEEN

DD:    06/05/2019 09:43:07

DT:    06/05/2019 23:28:23

Conf#: [id number]

DID#:  [id number]

## 2019-06-05 NOTE — PDOCDIS
Discharge Instructions


CONDITION


                 [id number]
Patient Condition:  Copdy9w
Good








ACTIVITY:


          [id number]
Activity Restrictions:  [id number]
No Restrictions








FOLLOW UP/APPOINTMENTS


Follow-up Plan


pcp 1 week











AKHIL FORBES MD                   Jun 5, 2019 09:10

## 2019-06-05 NOTE — CONS
Assessment/Plan


Assessment/Plan


Assessment/Plan (Recall)


52 yo F with multiple cerebrovascular risk factors who presents for evaluation 


of headache, dysphagia, and L hemiparesis... for which neurology is consulted. 





The clinical picture is most consistent with a complex migraine. 





Acute ischemia is unlikely.





MRI brain is reassuringly without acute intracranial pathology. 


CTA H/N is unrevealing. 


Echo is unrevealing. 


CUS is unrevealing.








P;


Toradol IV x 1 dose now followed by maintenance naproxen (with protonix) for 


abortive therapy


Cont other medical management per primary


PT/OT/ST as necessary


Will follow clinically, to recommend neurologic studies, as necessary





Consultation Date/Type/Reason


Admit Date/Time


Brendon 3, 2019 at 21:12


Type of Consult


Neurology


Reason for Consultation


L sided weakness, headache


Requesting Provider:  AKHIL FORBES MD


Date/Time of Note


DATE: 6/5/19 


TIME: 15:08





24 HR Interval Summary


Free Text/Dictation


Continues acute care. Pt states that she still has great difficulty swallowing 


and is still minimally able to move her L side. Still has c/o headache.





Exam/Review of Systems


Exam


Vitals





Vital Signs


  Date      Temp  Pulse  Resp  B/P (MAP)   Pulse Ox  O2          O2 Flow    FiO2


Time                                                 Delivery    Rate


    6/5/19           64


     12:31


    6/5/19  97.6           16      126/64        97


     07:34                           (84)


    6/4/19                                           Room Air


     11:03


    6/3/19                                                             2.0


     21:39








Intake and Output





6/4/19 6/4/19 6/5/19





1515:00


23:00


07:00





IntakeIntake Total


1000 ml





BalanceBalance


1000 ml











Exam


PE:


Gen Appearance: No Apparent Distress


HEENT: Normocephalic


Cardiovascular: Regular rate


Lungs: Clear bilaterally


Abdomen: Soft


Extremities: Dry





NE:


The patient was alert and oriented. Language was normal. Fund of knowledge was 


normal.





Pupils were equal and reactive to light. There was no afferent pupillary defect.


Visual fields were normal. Funduscopic examination was limited. Extra-ocular 


movements were full. Ptosis was absent. There was no nystagmus. Facial sensation


was normal. Face was symmetric with normal strength. Hearing was intact. Palate 


movements were normal. Neck strength was normal. There was normal tongue bulk 


and speed of movement.





Tone was normal. Muscle bulk was normal. I did not see fasciculations. Arms and 


legs were weak on the L, L arm less than gravity and L leg greater than gravity.





Vibration sensation was normal. Temperature and pinprick sensation was normal.





Rapid alternating movements were normal. There was no dysmetria. There was no 


intention tremor. Gait was deferred due to bedrest.





Arm and leg reflexes were 2+ and symmetric. Cat's sign was absent. Plantar 


responses were flexor.





Results


Result Diagram:  


6/3/19 1750                                                                     


          6/3/19 1750





Results 24hrs





Laboratory Tests


                         Test
            6/4/19
15:11


                         Bedside Glucose         102








Medications


Medication





Current Medications


Zolpidem Tartrate (Ambien) 5 mg HS  PRN PO INSOMNIA;  Start 6/3/19 at 22:00


Acetaminophen (Tylenol Supp) 650 mg Q6H  PRN [place] PAIN Last administered on 


6/4/19at 15:37; Admin Dose 650 MG;  Start 6/4/19 at 15:30


Dextrose/Sodium Chloride 1,000 ml @  100 mls/hr Q10H IV  Last administered on 


6/5/19at 05:30; Admin Dose 100 MLS/HR;  Start 6/4/19 at 15:30


Aspirin (Aspirin) 81 mg DAILY PO  Last administered on 6/5/19at 11:01; Admin 


Dose 81 MG;  Start 6/5/19 at 09:00


Atorvastatin Calcium (Lipitor) 40 mg HS PO ;  Start 6/4/19 at 21:00











ONUR MERRITT NP           Jun 5, 2019 15:08

## 2019-06-06 VITALS — SYSTOLIC BLOOD PRESSURE: 108 MMHG | RESPIRATION RATE: 17 BRPM | HEART RATE: 58 BPM | DIASTOLIC BLOOD PRESSURE: 61 MMHG

## 2019-06-06 VITALS — HEART RATE: 78 BPM

## 2019-06-06 VITALS — HEART RATE: 78 BPM | SYSTOLIC BLOOD PRESSURE: 113 MMHG | RESPIRATION RATE: 18 BRPM | DIASTOLIC BLOOD PRESSURE: 69 MMHG

## 2019-06-06 VITALS — HEART RATE: 70 BPM | RESPIRATION RATE: 17 BRPM | DIASTOLIC BLOOD PRESSURE: 55 MMHG | SYSTOLIC BLOOD PRESSURE: 102 MMHG

## 2019-06-06 VITALS — SYSTOLIC BLOOD PRESSURE: 131 MMHG | RESPIRATION RATE: 17 BRPM | DIASTOLIC BLOOD PRESSURE: 74 MMHG | HEART RATE: 68 BPM

## 2019-06-06 VITALS — HEART RATE: 62 BPM

## 2019-06-06 VITALS — HEART RATE: 65 BPM

## 2019-06-06 RX ADMIN — PANTOPRAZOLE SODIUM 1 MG: 40 TABLET, DELAYED RELEASE ORAL at 04:51

## 2019-06-06 RX ADMIN — NAPROXEN SCH MG: 250 TABLET ORAL at 09:38

## 2019-06-06 RX ADMIN — FOLIC ACID SCH MLS/HR: 5 INJECTION, SOLUTION INTRAMUSCULAR; INTRAVENOUS; SUBCUTANEOUS at 04:51

## 2019-06-06 RX ADMIN — ASPIRIN 81 MG SCH MG: 81 TABLET ORAL at 09:38

## 2019-06-06 RX ADMIN — ASCORBIC ACID, VITAMIN A PALMITATE, CHOLECALCIFEROL, THIAMINE HYDROCHLORIDE, RIBOFLAVIN-5 PHOSPHATE SODIUM, PYRIDOXINE HYDROCHLORIDE, NIACINAMIDE, DEXPANTHENOL, ALPHA-TOCOPHEROL ACETATE, VITAMIN K1, FOLIC ACID, BIOTIN, CYANOCOBALAMIN 1 MLS/HR: 200; 3300; 200; 6; 3.6; 6; 40; 15; 10; 150; 600; 60; 5 INJECTION, SOLUTION INTRAVENOUS at 04:51

## 2019-06-06 RX ADMIN — ASPIRIN 81 MG CHEWABLE TABLET 1 MG: 81 TABLET CHEWABLE at 09:38

## 2019-06-06 RX ADMIN — NAPROXEN 1 MG: 250 TABLET ORAL at 09:38

## 2019-06-06 NOTE — PN
Date/Time of Note


Date/Time of Note


DATE: 6/6/19 


TIME: 08:39





Subjective


Doing well.  Left-sided weakness is gradually improving





Objective


Vitals





Vital Signs


  Date      Temp  Pulse  Resp  B/P (MAP)   Pulse Ox  O2          O2 Flow    FiO2


Time                                                 Delivery    Rate


    6/6/19  97.7     78    18      113/69        98


     07:08                           (84)


    6/4/19                                           Room Air


     11:03


    6/3/19                                                             2.0


     21:39








Intake and Output





6/5/19 6/5/19 6/6/19





1515:00


23:00


07:00





IntakeIntake Total


1000 ml


1200 ml





BalanceBalance


1000 ml


1200 ml











Neck supple


Clear to auscultation bilaterally


Regular rate and rhythm


Soft nontender nondistended normoactive bowel sounds


No edema


Left upper extremity with 3 out of 5 strength.  Left lower extremity with 4 out 


of 5 strength.  Cranial nerves II through XII are intact





Results


Result Diagram:  


6/3/19 1750                                                                     


          6/3/19 1750








Medications


Medications





Current Medications


Zolpidem Tartrate (Ambien) 5 mg HS  PRN PO INSOMNIA;  Start 6/3/19 at 22:00


Acetaminophen (Tylenol Supp) 650 mg Q6H  PRN [place] PAIN Last administered on 


6/4/19at 15:37; Admin Dose 650 MG;  Start 6/4/19 at 15:30


Dextrose/Sodium Chloride 1,000 ml @  100 mls/hr Q10H IV  Last administered on 


6/6/19at 04:51; Admin Dose 100 MLS/HR;  Start 6/4/19 at 15:30


Aspirin (Aspirin) 81 mg DAILY PO  Last administered on 6/5/19at 11:01; Admin 


Dose 81 MG;  Start 6/5/19 at 09:00


Atorvastatin Calcium (Lipitor) 40 mg HS PO ;  Start 6/4/19 at 21:00


Naproxen (Naprosyn) 250 mg BID PO ;  Start 6/5/19 at 21:00


Pantoprazole (Protonix Tab) 40 mg DAILY@06 PO ;  Start 6/6/19 at 06:00





VTE Prophylaxis


Risk score (from Nsg)>0 risk:  2


SCD applied (from Nsg):  Yes





Lines/Catheters


IV Catheter Type:  Saline Lock


Muniz in Place:  No





Assessment/Plan


Assessment/Plan


53-year-old female with acute left-sided weakness.  Acute CVA has been ruled 


out.  Symptoms are most likely due to complex migraine


Right-sided headache


Dysphagia





Continue physical and speech therapy


Reassess swallowing


Neurology follow-up


Discharge planning











AKHIL FORBES MD                   Jun 6, 2019 08:42

## 2019-06-07 VITALS — SYSTOLIC BLOOD PRESSURE: 97 MMHG | RESPIRATION RATE: 17 BRPM | HEART RATE: 68 BPM | DIASTOLIC BLOOD PRESSURE: 57 MMHG
